# Patient Record
Sex: MALE | Race: OTHER | URBAN - METROPOLITAN AREA
[De-identification: names, ages, dates, MRNs, and addresses within clinical notes are randomized per-mention and may not be internally consistent; named-entity substitution may affect disease eponyms.]

---

## 2022-05-02 ENCOUNTER — NEW PATIENT (OUTPATIENT)
Dept: URBAN - METROPOLITAN AREA CLINIC 14 | Facility: CLINIC | Age: 62
End: 2022-05-02

## 2022-05-02 DIAGNOSIS — H25.13: ICD-10-CM

## 2022-05-02 DIAGNOSIS — H52.7: ICD-10-CM

## 2022-05-02 DIAGNOSIS — H52.13: ICD-10-CM

## 2022-05-02 PROCEDURE — 99204 OFFICE O/P NEW MOD 45 MIN: CPT

## 2022-05-02 PROCEDURE — 92136 OPHTHALMIC BIOMETRY: CPT

## 2022-05-02 ASSESSMENT — KERATOMETRY
OD_AXISANGLE2_DEGREES: 122
OD_K1POWER_DIOPTERS: 42.25
OD_K2POWER_DIOPTERS: 44.50
OS_AXISANGLE_DEGREES: 152
OD_AXISANGLE_DEGREES: 32
OS_K2POWER_DIOPTERS: 45.00
OS_AXISANGLE2_DEGREES: 62
OS_K1POWER_DIOPTERS: 43.00

## 2022-05-02 ASSESSMENT — TONOMETRY
OD_IOP_MMHG: 20
OS_IOP_MMHG: 19

## 2022-05-02 ASSESSMENT — VISUAL ACUITY
OD_CC: 20/60
OS_CC: 20/40
OD_PH: 20/40
OD_BCVA: 20/40
OS_BCVA: 20/30

## 2022-05-02 NOTE — PATIENT DISCUSSION
Discussed increased risk of floaters, myopic degeneration and retinal detachment associated with high myopia. Refer to a retina specialist for clearance prior to cataract surgery.

## 2022-05-02 NOTE — PATIENT DISCUSSION
Visually significant.  Schedule cataract surgery OS (left eye first 2nd to retinal history) then OD if visual symptoms persist.

## 2022-05-02 NOTE — PATIENT DISCUSSION
Discussed options to be less dependent on glasses. He is not a good candidate for advanced cataract surgery due to poor retina history. Discussed custom vision with a goal of plano in both eyes. Pt to consider options. He forfeited AVT today.

## 2022-06-23 ENCOUNTER — POST-OP (OUTPATIENT)
Dept: URBAN - METROPOLITAN AREA CLINIC 14 | Facility: CLINIC | Age: 62
End: 2022-06-23

## 2022-06-23 DIAGNOSIS — H25.11: ICD-10-CM

## 2022-06-23 DIAGNOSIS — Z96.1: ICD-10-CM

## 2022-06-23 PROCEDURE — 92136 OPHTHALMIC BIOMETRY: CPT

## 2022-06-23 PROCEDURE — 99024 POSTOP FOLLOW-UP VISIT: CPT

## 2022-06-23 ASSESSMENT — VISUAL ACUITY
OD_BCVA: 20/40
OS_SC: 20/50

## 2022-06-23 ASSESSMENT — KERATOMETRY
OD_AXISANGLE_DEGREES: 32
OD_K2POWER_DIOPTERS: 44.50
OS_AXISANGLE2_DEGREES: 62
OS_K2POWER_DIOPTERS: 45.00
OD_AXISANGLE2_DEGREES: 122
OS_AXISANGLE_DEGREES: 152
OD_K1POWER_DIOPTERS: 42.25
OS_K1POWER_DIOPTERS: 43.00

## 2022-06-23 ASSESSMENT — TONOMETRY
OD_IOP_MMHG: 22
OS_IOP_MMHG: 25

## 2022-06-29 ENCOUNTER — POST-OP (OUTPATIENT)
Dept: URBAN - METROPOLITAN AREA CLINIC 14 | Facility: CLINIC | Age: 62
End: 2022-06-29

## 2022-06-29 DIAGNOSIS — Z96.1: ICD-10-CM

## 2022-06-29 PROCEDURE — 99024 POSTOP FOLLOW-UP VISIT: CPT

## 2022-06-29 ASSESSMENT — VISUAL ACUITY: OD_SC: 20/100

## 2022-06-29 ASSESSMENT — TONOMETRY
OD_IOP_MMHG: 29
OD_IOP_MMHG: 31
OD_IOP_MMHG: 34

## 2022-06-29 ASSESSMENT — KERATOMETRY
OS_AXISANGLE2_DEGREES: 62
OD_AXISANGLE_DEGREES: 32
OD_K1POWER_DIOPTERS: 42.25
OS_AXISANGLE_DEGREES: 152
OS_K1POWER_DIOPTERS: 43.00
OD_K2POWER_DIOPTERS: 44.50
OS_K2POWER_DIOPTERS: 45.00
OD_AXISANGLE2_DEGREES: 122